# Patient Record
Sex: MALE | Race: BLACK OR AFRICAN AMERICAN | NOT HISPANIC OR LATINO | Employment: FULL TIME | URBAN - METROPOLITAN AREA
[De-identification: names, ages, dates, MRNs, and addresses within clinical notes are randomized per-mention and may not be internally consistent; named-entity substitution may affect disease eponyms.]

---

## 2023-11-09 ENCOUNTER — HOSPITAL ENCOUNTER (EMERGENCY)
Facility: HOSPITAL | Age: 38
Discharge: HOME | End: 2023-11-09
Attending: EMERGENCY MEDICINE

## 2023-11-09 VITALS
BODY MASS INDEX: 27.99 KG/M2 | TEMPERATURE: 97.9 F | HEIGHT: 65 IN | SYSTOLIC BLOOD PRESSURE: 171 MMHG | DIASTOLIC BLOOD PRESSURE: 114 MMHG | HEART RATE: 100 BPM | OXYGEN SATURATION: 99 % | WEIGHT: 168 LBS | RESPIRATION RATE: 18 BRPM

## 2023-11-09 DIAGNOSIS — M79.605 LEFT LEG PAIN: Primary | ICD-10-CM

## 2023-11-09 DIAGNOSIS — I10 ASYMPTOMATIC HYPERTENSION: ICD-10-CM

## 2023-11-09 PROCEDURE — 99284 EMERGENCY DEPT VISIT MOD MDM: CPT | Performed by: EMERGENCY MEDICINE

## 2023-11-09 PROCEDURE — 96372 THER/PROPH/DIAG INJ SC/IM: CPT

## 2023-11-09 PROCEDURE — 99283 EMERGENCY DEPT VISIT LOW MDM: CPT | Mod: 25

## 2023-11-09 PROCEDURE — 2500000001 HC RX 250 WO HCPCS SELF ADMINISTERED DRUGS (ALT 637 FOR MEDICARE OP)

## 2023-11-09 PROCEDURE — 2500000004 HC RX 250 GENERAL PHARMACY W/ HCPCS (ALT 636 FOR OP/ED): Performed by: EMERGENCY MEDICINE

## 2023-11-09 PROCEDURE — 99285 EMERGENCY DEPT VISIT HI MDM: CPT | Performed by: EMERGENCY MEDICINE

## 2023-11-09 PROCEDURE — 2500000004 HC RX 250 GENERAL PHARMACY W/ HCPCS (ALT 636 FOR OP/ED)

## 2023-11-09 RX ORDER — AMLODIPINE BESYLATE 5 MG/1
5 TABLET ORAL ONCE
Status: COMPLETED | OUTPATIENT
Start: 2023-11-09 | End: 2023-11-09

## 2023-11-09 RX ORDER — CYCLOBENZAPRINE HCL 5 MG
5 TABLET ORAL EVERY 8 HOURS
Qty: 21 TABLET | Refills: 0 | Status: SHIPPED | OUTPATIENT
Start: 2023-11-09 | End: 2023-11-16

## 2023-11-09 RX ORDER — KETOROLAC TROMETHAMINE 30 MG/ML
30 INJECTION, SOLUTION INTRAMUSCULAR; INTRAVENOUS ONCE
Status: COMPLETED | OUTPATIENT
Start: 2023-11-09 | End: 2023-11-09

## 2023-11-09 RX ORDER — ORPHENADRINE CITRATE 30 MG/ML
60 INJECTION INTRAMUSCULAR; INTRAVENOUS ONCE
Status: COMPLETED | OUTPATIENT
Start: 2023-11-09 | End: 2023-11-09

## 2023-11-09 RX ORDER — IBUPROFEN 600 MG/1
600 TABLET ORAL EVERY 6 HOURS PRN
Qty: 56 TABLET | Refills: 0 | Status: SHIPPED | OUTPATIENT
Start: 2023-11-09 | End: 2023-11-23

## 2023-11-09 RX ORDER — AMLODIPINE BESYLATE 5 MG/1
5 TABLET ORAL DAILY
Qty: 30 TABLET | Refills: 0 | Status: SHIPPED | OUTPATIENT
Start: 2023-11-09 | End: 2023-12-09

## 2023-11-09 RX ADMIN — AMLODIPINE BESYLATE 5 MG: 5 TABLET ORAL at 19:43

## 2023-11-09 RX ADMIN — ORPHENADRINE CITRATE 60 MG: 60 INJECTION INTRAMUSCULAR; INTRAVENOUS at 19:43

## 2023-11-09 RX ADMIN — KETOROLAC TROMETHAMINE 30 MG: 30 INJECTION, SOLUTION INTRAMUSCULAR; INTRAVENOUS at 19:43

## 2023-11-09 ASSESSMENT — COLUMBIA-SUICIDE SEVERITY RATING SCALE - C-SSRS
2. HAVE YOU ACTUALLY HAD ANY THOUGHTS OF KILLING YOURSELF?: NO
1. IN THE PAST MONTH, HAVE YOU WISHED YOU WERE DEAD OR WISHED YOU COULD GO TO SLEEP AND NOT WAKE UP?: NO
6. HAVE YOU EVER DONE ANYTHING, STARTED TO DO ANYTHING, OR PREPARED TO DO ANYTHING TO END YOUR LIFE?: NO

## 2023-11-09 NOTE — ED TRIAGE NOTES
Left leg pain and numbness. Atraumatic. States pain is lateral thigh. Pt has hx of htn, non complaint with hydrochlorothiazide.

## 2023-11-09 NOTE — ED NOTES
"Pt presents to ED for leg pain and numbness x1week. Rates pain 8/10 in LLE. States that he feels throbbing sensation when he's sleeping. Denies injury or trauma but states it \"felt like something bit him last week.\" No bite rhonda, inflammation, or redness noted.      Kaylee Villarreal RN  11/09/23 8739    "

## 2023-11-10 NOTE — ED PROVIDER NOTES
CC: Leg Pain     History provided by: Patient  Limitations to History: None    HPI:  Patient is 38-year-old male with a PMH of hypertension not compliant on hydrochlorothiazide who presents to the ED for CC of left leg pain.  Patient reports that approximately 1 week ago he was working on a car when he felt like something bit his left posterior thigh but he did not actually see anything bite him. He denies any falls.  Approximately 4 days ago the patient began having left lateral thigh pain that is intermittent in nature and exacerbated with walking or lower extremity movements.  Patient reports that if he rests the pain gets better.  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  He has tried no OTC medications.  He denies any rashes or swelling. He reports that he does not take his hydrochlorothiazide due to a groggy feeling he gets in the morning.  He does not currently have a PCP.  Patient denies headache, dizziness, vision changes, neck pain, chest pain, shortness of breath, nausea, vomiting, abdominal pain, lower extremity swelling, or fevers at home. In contrast to the triage note, the patient denies any numbness or tingling. No sick contacts or recent travels. No prolonged periods of immobilization.     External Records Reviewed: Previous ED records, inpatient records, and outpatient records  ???????????????????????????????????????????????????????????????  Triage Vitals:  T 36.6 °C (97.9 °F)    BP (!) 171/114  RR 18  O2 99 %      Physical Exam  Constitutional:       General: He is awake. He is not in acute distress.     Appearance: He is not ill-appearing, toxic-appearing or diaphoretic.   HENT:      Head: Normocephalic and atraumatic.   Cardiovascular:      Rate and Rhythm: Normal rate and regular rhythm.      Pulses:           Radial pulses are 2+ on the right side and 2+ on the left side.        Dorsalis pedis pulses are 2+ on the right side and 2+ on  the left side.      Heart sounds: Normal heart sounds. Heart sounds not distant.      No friction rub. No gallop.   Musculoskeletal:      Right lower leg: No edema.      Left lower leg: No edema.      Comments: Mild TTP over left lateral IT band.    Skin:     General: Skin is warm and dry.      Capillary Refill: Capillary refill takes 2 to 3 seconds.      Comments: There are no sign of  localized infection, abscess, or cellulitis of the left thigh.   Neurological:      Mental Status: He is alert and oriented to person, place, and time.      Sensory: Sensation is intact. No sensory deficit.      Motor: Motor function is intact. No weakness.      Gait: Gait is intact.      Comments: Sensation and strength is intact to left lower extremity.        ???????????????????????????????????????????????????????????????  ED Course/Treatment/Medical Decision Making    EKG Interpretation:  No EKG performed    Independent Interpretation of Studies:  I independently interpreted: None    Differential diagnoses considered include but ar not limited to: Muscloskeletal pain/strain, femur fracture, DVT, cellulitis, insect bite, abscess    Social Determinants Limiting Care:  None identified         ED Course:  Diagnoses as of 11/09/23 2000   Left leg pain   Asymptomatic hypertension       MDM:  Patient is a 38-year-old male with above PMH who presents to the ED for CC of left upper leg pain.  Upon arrival patient's vital signs remarkable for hypertension 171/114, he is nontoxic-appearing, and appears no acute distress.  Patient is neurologically and neurovascularly intact. Upon examination, the patient has no signs of localized left thigh infection. No erythema, induration, fluctuance, edema, or overlying warmth to touch. No evidence of any cellulitis or abscess. No evidence of any insect bite, edema, and no bony TTP.  Patient has mild pain to left lateral IT band.  Patient has no risk factors for DVT, no lower extremity swelling, and no  calf tenderness therefore DVT is less likely today.  Given no falls and no bony tenderness, xray was considered but not felt to be indicated at this time. Patient denies headache, chest pain, hematuria, or shortness of breath therefore patient is hypertension is likely asymptomatic in nature.  Patient's pain was treated with Norflex and Toradol IM.  He will be given 5 mg p.o. amlodipine to treat his HTN. Patient is well appearing, ambulatory with steady gait. Patient was discharged in stable condition with Rx for ibuprofen, Flexeril, and amlodipine.  He was also given appropriate primary care follow-up at the [facility] to follow his hypertension. Return precautions were provided, for which the patient expressed understanding. The patient was discharged home in stable condition. They should feel free to return to the Emergency Department at any time should their condition worsen or should they have any questions or concerns.     Impression:  Left thigh pain    Disposition:  Discharged home    Huang Villavicencio DO   Emergency Medicine, PGY-1       Procedures ? [employer] last updated 11/9/2023 7:32 PM          Huang Villavicencio DO  Resident  11/10/23 1400       Marylin Scott MD  11/10/23 9155